# Patient Record
Sex: FEMALE | Race: WHITE | Employment: UNEMPLOYED | URBAN - METROPOLITAN AREA
[De-identification: names, ages, dates, MRNs, and addresses within clinical notes are randomized per-mention and may not be internally consistent; named-entity substitution may affect disease eponyms.]

---

## 2020-01-24 ENCOUNTER — HOSPITAL ENCOUNTER (OUTPATIENT)
Dept: GENERAL RADIOLOGY | Age: 30
Discharge: HOME | End: 2020-01-26
Payer: COMMERCIAL

## 2020-01-24 ENCOUNTER — HOSPITAL ENCOUNTER (OUTPATIENT)
Age: 30
Discharge: HOME | End: 2020-01-26
Payer: COMMERCIAL

## 2020-01-24 PROCEDURE — 72050 X-RAY EXAM NECK SPINE 4/5VWS: CPT

## 2020-02-25 ENCOUNTER — TELEPHONE (OUTPATIENT)
Dept: NEUROLOGY | Age: 30
End: 2020-02-25

## 2020-02-25 NOTE — TELEPHONE ENCOUNTER
Called and talked to Monico Chavez at Dr. Christopher Dawson office letting her know we have made 3 attempts to contact patient to schedule her for the referral.

## 2024-04-05 ENCOUNTER — OFFICE VISIT (OUTPATIENT)
Dept: OBGYN | Age: 34
End: 2024-04-05

## 2024-04-05 VITALS
HEART RATE: 81 BPM | BODY MASS INDEX: 49.79 KG/M2 | HEIGHT: 63 IN | SYSTOLIC BLOOD PRESSURE: 116 MMHG | DIASTOLIC BLOOD PRESSURE: 66 MMHG | WEIGHT: 281 LBS

## 2024-04-05 DIAGNOSIS — Z11.3 SCREENING FOR STD (SEXUALLY TRANSMITTED DISEASE): ICD-10-CM

## 2024-04-05 DIAGNOSIS — Z12.4 SCREENING FOR CERVICAL CANCER: ICD-10-CM

## 2024-04-05 DIAGNOSIS — Z80.9 FAMILY HISTORY OF CANCER: ICD-10-CM

## 2024-04-05 DIAGNOSIS — Z01.419 ENCOUNTER FOR ANNUAL ROUTINE GYNECOLOGICAL EXAMINATION: Primary | ICD-10-CM

## 2024-04-05 SDOH — ECONOMIC STABILITY: HOUSING INSECURITY
IN THE LAST 12 MONTHS, WAS THERE A TIME WHEN YOU DID NOT HAVE A STEADY PLACE TO SLEEP OR SLEPT IN A SHELTER (INCLUDING NOW)?: NO

## 2024-04-05 SDOH — ECONOMIC STABILITY: FOOD INSECURITY: WITHIN THE PAST 12 MONTHS, THE FOOD YOU BOUGHT JUST DIDN'T LAST AND YOU DIDN'T HAVE MONEY TO GET MORE.: NEVER TRUE

## 2024-04-05 SDOH — ECONOMIC STABILITY: INCOME INSECURITY: HOW HARD IS IT FOR YOU TO PAY FOR THE VERY BASICS LIKE FOOD, HOUSING, MEDICAL CARE, AND HEATING?: NOT VERY HARD

## 2024-04-05 SDOH — ECONOMIC STABILITY: FOOD INSECURITY: WITHIN THE PAST 12 MONTHS, YOU WORRIED THAT YOUR FOOD WOULD RUN OUT BEFORE YOU GOT MONEY TO BUY MORE.: NEVER TRUE

## 2024-04-05 NOTE — PROGRESS NOTES
Obtained specimen via right hand. Skin intact, no redness or swelling noted at venipuncture site. No complaints of discomfort voiced by patient. All specimens labeled and sent to lab.     Discharge instructions have been discussed with the patient. Patient advised to call our office with any questions or concerns.   Voiced understanding.   
Patient alert and pleasant with no complaints  Here today for annual GYN visit.  Assisted with pelvic exam, pap smear obtained, labeled and sent to lab.   Discharge instructions have been discussed with the patient. Patient advised to call our office with any questions or concerns.   Voiced understanding.   
lesions  Vulva: normal appearing vulva with no masses, tenderness or lesions  Vagina: normal vaginal lining without lesion or discharge   Cervix: normal appearing cervix without discharge or lesions  Uterus: uterus is normal size, shape, consistency and nontender,   Adnexa: normal adnexa in size, nontender and no masses.  Perineum: normal appearing without lesions or masses  Anus: normal appearing without lesions or masses, no fissures or hemorrhoids    Psych: tearful, anxious      An electronic signature was used to authenticate this note.    --Georgina Casillas MD

## 2024-04-08 LAB
HEPATITIS C ANTIBODY: NONREACTIVE
RPR: NONREACTIVE

## 2024-04-10 LAB
GYNECOLOGY CYTOLOGY REPORT: NORMAL
HPV SAMPLE: NORMAL
HPV SOURCE: NORMAL
HPV, GENOTYPE 16: NOT DETECTED
HPV, GENOTYPE 18: NOT DETECTED
HPV, HIGH RISK OTHER: NOT DETECTED
HPV, INTERPRETATION: NORMAL

## 2024-04-11 LAB
CHLAMYDIA BY THIN PREP: NEGATIVE
N. GONORRHOEAE DNA, THIN PREP: NEGATIVE

## 2025-02-19 ENCOUNTER — OFFICE VISIT (OUTPATIENT)
Dept: PULMONOLOGY | Age: 35
End: 2025-02-19

## 2025-02-19 VITALS — HEIGHT: 63 IN | BODY MASS INDEX: 51.38 KG/M2 | WEIGHT: 290 LBS

## 2025-02-19 DIAGNOSIS — R05.1 ACUTE COUGH: Primary | ICD-10-CM

## 2025-02-19 DIAGNOSIS — J34.89 NASAL OBSTRUCTION WITHOUT CHOANAL ATRESIA: ICD-10-CM

## 2025-02-19 DIAGNOSIS — F84.0 AUTISM: ICD-10-CM

## 2025-02-19 DIAGNOSIS — F43.10 PTSD (POST-TRAUMATIC STRESS DISORDER): ICD-10-CM

## 2025-02-19 DIAGNOSIS — E66.01 OBESITY, CLASS III, BMI 40-49.9 (MORBID OBESITY): ICD-10-CM

## 2025-02-19 LAB
EXPIRATORY TIME: 0.99 SEC
FEF 25-75% %PRED-PRE: 124 L/SEC
FEF 25-75% PRED: 3.25 L/SEC
FEF 25-75-PRE: 4.05 L/SEC
FEV1 %PRED-PRE: 105 %
FEV1 PRED: 2.98 L
FEV1/FVC %PRED-PRE: 119 %
FEV1/FVC PRED: 84 %
FEV1/FVC: 100 %
FEV1: 3.15 L
FVC %PRED-PRE: 87 %
FVC PRED: 3.58 L
FVC: 3.15 L
PEF %PRED-PRE: NORMAL
PEF PRED: NORMAL
PEF-PRE: NORMAL

## 2025-02-19 ASSESSMENT — PULMONARY FUNCTION TESTS
FEV1/FVC_PREDICTED: 84
FEV1/FVC_PERCENT_PREDICTED_PRE: 119
FVC_PERCENT_PREDICTED_PRE: 87
FVC: 3.15
FEV1: 3.15
FEV1_PERCENT_PREDICTED_PRE: 105
FEV1_PREDICTED: 2.98
FEV1/FVC: 100
FVC_PREDICTED: 3.58

## 2025-02-19 NOTE — PROGRESS NOTES
[facility]  Department of Internal Medicine   Division of Pulmonary, Critical Care and Sleep Medicine  [facility]  Office [phone], Fax [phone]    Dear Go Adams, DO    We had the pleasure of seeing Kaylen See, in the [facility] at [facility] regarding her cough intermittent cough    HISTORY OF PRESENT ILLNESS:    Kaylen See is a 35 y.o. female who is obese and a non-smoker was seen for pulmonary assessment of an intermittent cough that started about 4 years ago.  The cough is not associated with any seasonal developments foods and/or known allergies although 10 years ago plus she underwent allergy testing due to peanut and sensitivity but never underwent formal treatments or on Biologics.  She has had multiple sinus surgeries in the past and admits to some postnasal drainage headaches and ear pressure.  She denies any food related symptoms such as heartburn or reflux 30% can be silent.  There is no history of strokes in the past or issues related to esophageal and/or swallowing difficulties.  No dysphagia or dyne aphasia was elicited.  She is a lifelong non-smoker does not vape or use illicit drugs.  She is at extensive emotional history and behavioral/mental health which she is under care.  The cough she admits to is intermittent 4 times since 2022 goes away on its own last for about 6 weeks is nonproductive with no history of hemoptysis for the above pulmonary evaluation was entertained      ALLERGIES:    Allergies   Allergen Reactions    Peanut-Containing Drug Products Anaphylaxis and Rash    Versed [Midazolam] Anaphylaxis    Cephalexin Swelling and Dermatitis    Midazolam Hcl Swelling and Other (See Comments)     Beat red    Biaxin [Clarithromycin] Rash    Erythromycin Rash    Pcn [Penicillins] Swelling and Rash       PAST MEDICAL HISTORY:       Diagnosis Date    Anxiety     Cat

## 2025-02-20 ENCOUNTER — TELEPHONE (OUTPATIENT)
Dept: PULMONOLOGY | Age: 35
End: 2025-02-20

## 2025-02-20 NOTE — TELEPHONE ENCOUNTER
Mailed a letter to patient informing her that her Bronchial Challenge is scheduled for 3-17-25 at 8:30 am at the [facility]. She must arrive by 8:00 am. She is to have no caffeine for 24 hours prior to test and no resp meds for at least 4 hours prior to test.    Her CT Sinus is scheduled for 3-17-25 at 10:00 am directly after her Bronchial challenge. There is no prep for this test.    Her Modified Barium Swallow test is scheduled for 3-17-25 at 10:30 am directly after her CT Sinus. There is no prep for this test.    Her Esophagram is scheduled for 3-20-25 at 10:30 am at the [facility]. She must arrive by 10:00 am. There is no  prep for this test

## 2025-03-17 ENCOUNTER — HOSPITAL ENCOUNTER (OUTPATIENT)
Dept: CT IMAGING | Age: 35
Discharge: HOME | End: 2025-03-19
Attending: INTERNAL MEDICINE
Payer: COMMERCIAL

## 2025-03-17 ENCOUNTER — HOSPITAL ENCOUNTER (OUTPATIENT)
Dept: GENERAL RADIOLOGY | Age: 35
Discharge: HOME | End: 2025-03-19
Attending: INTERNAL MEDICINE
Payer: COMMERCIAL

## 2025-03-17 ENCOUNTER — HOSPITAL ENCOUNTER (OUTPATIENT)
Dept: PULMONOLOGY | Age: 35
Discharge: HOME | End: 2025-03-17
Attending: INTERNAL MEDICINE
Payer: COMMERCIAL

## 2025-03-17 DIAGNOSIS — E66.01 OBESITY, CLASS III, BMI 40-49.9 (MORBID OBESITY): ICD-10-CM

## 2025-03-17 DIAGNOSIS — F84.0 AUTISM: ICD-10-CM

## 2025-03-17 DIAGNOSIS — F43.10 PTSD (POST-TRAUMATIC STRESS DISORDER): ICD-10-CM

## 2025-03-17 DIAGNOSIS — J34.89 NASAL OBSTRUCTION WITHOUT CHOANAL ATRESIA: ICD-10-CM

## 2025-03-17 DIAGNOSIS — R05.1 ACUTE COUGH: ICD-10-CM

## 2025-03-17 PROCEDURE — 6360000004 HC RX CONTRAST MEDICATION: Performed by: RADIOLOGY

## 2025-03-17 PROCEDURE — 2500000003 HC RX 250 WO HCPCS: Performed by: PHYSICIAN ASSISTANT

## 2025-03-17 PROCEDURE — 70488 CT MAXILLOFACIAL W/O & W/DYE: CPT

## 2025-03-17 PROCEDURE — 74230 X-RAY XM SWLNG FUNCJ C+: CPT

## 2025-03-17 PROCEDURE — 2500000003 HC RX 250 WO HCPCS: Performed by: RADIOLOGY

## 2025-03-17 PROCEDURE — 92611 MOTION FLUOROSCOPY/SWALLOW: CPT | Performed by: SPEECH-LANGUAGE PATHOLOGIST

## 2025-03-17 PROCEDURE — 92526 ORAL FUNCTION THERAPY: CPT | Performed by: SPEECH-LANGUAGE PATHOLOGIST

## 2025-03-17 PROCEDURE — 94070 EVALUATION OF WHEEZING: CPT

## 2025-03-17 RX ORDER — SODIUM CHLORIDE 0.9 % (FLUSH) 0.9 %
10 SYRINGE (ML) INJECTION
Status: COMPLETED | OUTPATIENT
Start: 2025-03-17 | End: 2025-03-17

## 2025-03-17 RX ORDER — IOPAMIDOL 755 MG/ML
75 INJECTION, SOLUTION INTRAVASCULAR
Status: COMPLETED | OUTPATIENT
Start: 2025-03-17 | End: 2025-03-17

## 2025-03-17 RX ADMIN — SODIUM CHLORIDE, PRESERVATIVE FREE 10 ML: 5 INJECTION INTRAVENOUS at 10:00

## 2025-03-17 RX ADMIN — BARIUM SULFATE 15 ML: 400 PASTE ORAL at 10:21

## 2025-03-17 RX ADMIN — BARIUM SULFATE 15 ML: 400 SUSPENSION ORAL at 10:22

## 2025-03-17 RX ADMIN — IOPAMIDOL 75 ML: 755 INJECTION, SOLUTION INTRAVENOUS at 09:49

## 2025-03-17 RX ADMIN — BARIUM SULFATE 15 ML: 0.81 POWDER, FOR SUSPENSION ORAL at 10:22

## 2025-03-21 ENCOUNTER — HOSPITAL ENCOUNTER (OUTPATIENT)
Age: 35
Discharge: HOME | End: 2025-03-23
Payer: COMMERCIAL

## 2025-03-21 ENCOUNTER — HOSPITAL ENCOUNTER (OUTPATIENT)
Dept: GENERAL RADIOLOGY | Age: 35
Discharge: HOME | End: 2025-03-23
Payer: COMMERCIAL

## 2025-03-21 DIAGNOSIS — F84.0 AUTISM: ICD-10-CM

## 2025-03-21 DIAGNOSIS — F43.10 PTSD (POST-TRAUMATIC STRESS DISORDER): ICD-10-CM

## 2025-03-21 DIAGNOSIS — J34.89 NASAL OBSTRUCTION WITHOUT CHOANAL ATRESIA: ICD-10-CM

## 2025-03-21 DIAGNOSIS — R05.1 ACUTE COUGH: ICD-10-CM

## 2025-03-21 DIAGNOSIS — E66.01 OBESITY, CLASS III, BMI 40-49.9 (MORBID OBESITY): ICD-10-CM

## 2025-03-21 PROCEDURE — 71046 X-RAY EXAM CHEST 2 VIEWS: CPT

## 2025-03-24 ENCOUNTER — HOSPITAL ENCOUNTER (OUTPATIENT)
Age: 35
Discharge: HOME | End: 2025-03-24
Payer: COMMERCIAL

## 2025-03-24 DIAGNOSIS — F43.10 PTSD (POST-TRAUMATIC STRESS DISORDER): ICD-10-CM

## 2025-03-24 DIAGNOSIS — J34.89 NASAL OBSTRUCTION WITHOUT CHOANAL ATRESIA: ICD-10-CM

## 2025-03-24 DIAGNOSIS — F84.0 AUTISM: ICD-10-CM

## 2025-03-24 DIAGNOSIS — R05.1 ACUTE COUGH: ICD-10-CM

## 2025-03-24 DIAGNOSIS — E66.01 OBESITY, CLASS III, BMI 40-49.9 (MORBID OBESITY): ICD-10-CM

## 2025-03-24 PROCEDURE — 86003 ALLG SPEC IGE CRUDE XTRC EA: CPT

## 2025-03-24 PROCEDURE — 36415 COLL VENOUS BLD VENIPUNCTURE: CPT

## 2025-03-24 PROCEDURE — 82785 ASSAY OF IGE: CPT

## 2025-03-25 LAB
A ALTERNATA IGE QN: <0.1 KU/L (ref 0–0.34)
A FUMIGATUS IGE QN: <0.1 KU/L (ref 0–0.34)
ALLERGEN BIRCH IGE: <0.1 KU/L (ref 0–0.34)
BERMUDA GRASS IGE QN: <0.1 KU/L (ref 0–0.34)
BOXELDER IGE QN: <0.1 KU/L (ref 0–0.34)
C HERBARUM IGE QN: <0.1 KUL/L (ref 0–0.34)
CALIF WALNUT POLN IGE QN: <0.1 KU/L (ref 0–0.34)
CAT DANDER IGE QN: <0.1 KU/L (ref 0–0.34)
CMN PIGWEED IGE QN: <0.1 KU/L (ref 0–0.34)
COMMON RAGWEED IGE QN: 0.15 KU/L (ref 0–0.34)
COTTONWOOD IGE QN: <0.1 KU/L (ref 0–0.34)
D FARINAE IGE QN: <0.1 KU/L (ref 0–0.34)
D PTERONYSS IGE QN: <0.1 KU/L (ref 0–0.34)
DOG DANDER IGE QN: <0.1 KU/L (ref 0–0.34)
IGE SERPL-ACNC: 3 IU/ML (ref 0–100)
IGE SERPL-ACNC: 3 IU/ML (ref 0–100)
LONDON PLANE IGE QN: <0.1 KU/L (ref 0–0.34)
M RACEMOSUS IGE QN: <0.1 KU/L (ref 0–0.34)
MOUSE EPITH IGE QN: <0.1 KU/L (ref 0–0.34)
MT JUNIPER IGE QN: <0.1 KU/L (ref 0–0.34)
P NOTATUM IGE QN: <0.1 KU/L (ref 0–0.34)
PECAN/HICK TREE IGE QN: <0.1 KU/L (ref 0–0.34)
ROACH IGE QN: <0.1 KU/L (ref 0–0.34)
SALTWORT IGE QN: <0.1 KU/L (ref 0–0.34)
SHEEP SORREL IGE QN: <0.1 KU/L (ref 0–0.34)
TIMOTHY IGE QN: <0.1 KU/L (ref 0–0.34)
WHITE ASH IGE QN: <0.1 KU/L (ref 0–0.34)
WHITE ELM IGE QN: <0.1 KU/L (ref 0–0.34)
WHITE MULBERRY IGE QN: <0.1 KU/L (ref 0–0.34)
WHITE OAK IGE QN: <0.1 KU/L (ref 0–0.34)

## 2025-04-01 PROBLEM — J34.89 NASAL OBSTRUCTION WITHOUT CHOANAL ATRESIA: Status: ACTIVE | Noted: 2025-04-01

## 2025-04-01 PROBLEM — F43.10 PTSD (POST-TRAUMATIC STRESS DISORDER): Status: ACTIVE | Noted: 2025-04-01

## 2025-04-01 PROBLEM — R05.1 ACUTE COUGH: Status: ACTIVE | Noted: 2025-04-01

## 2025-04-01 PROBLEM — F84.0 AUTISM: Status: ACTIVE | Noted: 2025-04-01

## 2025-09-05 ENCOUNTER — CLINICAL DOCUMENTATION (OUTPATIENT)
Dept: OBGYN | Age: 35
End: 2025-09-05

## 2025-09-05 ENCOUNTER — OFFICE VISIT (OUTPATIENT)
Dept: OBGYN | Age: 35
End: 2025-09-05
Payer: COMMERCIAL

## 2025-09-05 VITALS
BODY MASS INDEX: 50.09 KG/M2 | OXYGEN SATURATION: 98 % | WEIGHT: 280.5 LBS | SYSTOLIC BLOOD PRESSURE: 126 MMHG | DIASTOLIC BLOOD PRESSURE: 64 MMHG | HEART RATE: 82 BPM | TEMPERATURE: 98.2 F

## 2025-09-05 DIAGNOSIS — Z01.419 WOMEN'S ANNUAL ROUTINE GYNECOLOGICAL EXAMINATION: Primary | ICD-10-CM

## 2025-09-05 DIAGNOSIS — Z11.3 SCREEN FOR STD (SEXUALLY TRANSMITTED DISEASE): ICD-10-CM

## 2025-09-05 LAB — HIV AG/AB: NONREACTIVE

## 2025-09-05 PROCEDURE — 99395 PREV VISIT EST AGE 18-39: CPT | Performed by: FAMILY MEDICINE

## 2025-09-05 RX ORDER — ALBUTEROL SULFATE 90 UG/1
INHALANT RESPIRATORY (INHALATION)
COMMUNITY
Start: 2024-09-30